# Patient Record
Sex: MALE | Race: WHITE | URBAN - METROPOLITAN AREA
[De-identification: names, ages, dates, MRNs, and addresses within clinical notes are randomized per-mention and may not be internally consistent; named-entity substitution may affect disease eponyms.]

---

## 2022-08-08 PROBLEM — Z00.00 ENCOUNTER FOR PREVENTIVE HEALTH EXAMINATION: Status: ACTIVE | Noted: 2022-08-08

## 2022-08-15 ENCOUNTER — EMERGENCY (EMERGENCY)
Facility: HOSPITAL | Age: 57
LOS: 0 days | Discharge: HOME | End: 2022-08-15
Attending: STUDENT IN AN ORGANIZED HEALTH CARE EDUCATION/TRAINING PROGRAM

## 2022-08-15 VITALS
TEMPERATURE: 98 F | HEIGHT: 67 IN | RESPIRATION RATE: 18 BRPM | OXYGEN SATURATION: 98 % | HEART RATE: 60 BPM | WEIGHT: 167.99 LBS | SYSTOLIC BLOOD PRESSURE: 130 MMHG | DIASTOLIC BLOOD PRESSURE: 84 MMHG

## 2022-08-15 DIAGNOSIS — Z79.84 LONG TERM (CURRENT) USE OF ORAL HYPOGLYCEMIC DRUGS: ICD-10-CM

## 2022-08-15 DIAGNOSIS — Z79.4 LONG TERM (CURRENT) USE OF INSULIN: ICD-10-CM

## 2022-08-15 DIAGNOSIS — E11.9 TYPE 2 DIABETES MELLITUS WITHOUT COMPLICATIONS: ICD-10-CM

## 2022-08-15 DIAGNOSIS — Z88.1 ALLERGY STATUS TO OTHER ANTIBIOTIC AGENTS STATUS: ICD-10-CM

## 2022-08-15 DIAGNOSIS — R42 DIZZINESS AND GIDDINESS: ICD-10-CM

## 2022-08-15 DIAGNOSIS — T38.3X6A UNDERDOSING OF INSULIN AND ORAL HYPOGLYCEMIC [ANTIDIABETIC] DRUGS, INITIAL ENCOUNTER: ICD-10-CM
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
## 2022-08-15 DIAGNOSIS — Z91.138 PATIENT'S UNINTENTIONAL UNDERDOSING OF MEDICATION REGIMEN FOR OTHER REASON: ICD-10-CM
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
## 2022-08-15 LAB
ALBUMIN SERPL ELPH-MCNC: 4.7 G/DL — SIGNIFICANT CHANGE UP (ref 3.5–5.2)
ALP SERPL-CCNC: 65 U/L — SIGNIFICANT CHANGE UP (ref 30–115)
ALT FLD-CCNC: 23 U/L — SIGNIFICANT CHANGE UP (ref 0–41)
ANION GAP SERPL CALC-SCNC: 11 MMOL/L — SIGNIFICANT CHANGE UP (ref 7–14)
APPEARANCE UR: CLEAR — SIGNIFICANT CHANGE UP
AST SERPL-CCNC: 19 U/L — SIGNIFICANT CHANGE UP (ref 0–41)
B-OH-BUTYR SERPL-SCNC: 0.3 MMOL/L — SIGNIFICANT CHANGE UP
BASOPHILS # BLD AUTO: 0.02 K/UL — SIGNIFICANT CHANGE UP (ref 0–0.2)
BASOPHILS NFR BLD AUTO: 0.4 % — SIGNIFICANT CHANGE UP (ref 0–1)
BILIRUB SERPL-MCNC: 0.5 MG/DL — SIGNIFICANT CHANGE UP (ref 0.2–1.2)
BILIRUB UR-MCNC: NEGATIVE — SIGNIFICANT CHANGE UP
BUN SERPL-MCNC: 10 MG/DL — SIGNIFICANT CHANGE UP (ref 10–20)
CALCIUM SERPL-MCNC: 9.8 MG/DL — SIGNIFICANT CHANGE UP (ref 8.5–10.1)
CHLORIDE SERPL-SCNC: 94 MMOL/L — LOW (ref 98–110)
CO2 SERPL-SCNC: 27 MMOL/L — SIGNIFICANT CHANGE UP (ref 17–32)
COLOR SPEC: SIGNIFICANT CHANGE UP
CREAT SERPL-MCNC: 1 MG/DL — SIGNIFICANT CHANGE UP (ref 0.7–1.5)
DIFF PNL FLD: NEGATIVE — SIGNIFICANT CHANGE UP
EGFR: 88 ML/MIN/1.73M2 — SIGNIFICANT CHANGE UP
EOSINOPHIL # BLD AUTO: 0.07 K/UL — SIGNIFICANT CHANGE UP (ref 0–0.7)
EOSINOPHIL NFR BLD AUTO: 1.3 % — SIGNIFICANT CHANGE UP (ref 0–8)
GLUCOSE SERPL-MCNC: 304 MG/DL — HIGH (ref 70–99)
GLUCOSE UR QL: ABNORMAL
HCT VFR BLD CALC: 37.7 % — LOW (ref 42–52)
HGB BLD-MCNC: 13.5 G/DL — LOW (ref 14–18)
IMM GRANULOCYTES NFR BLD AUTO: 0.4 % — HIGH (ref 0.1–0.3)
KETONES UR-MCNC: NEGATIVE — SIGNIFICANT CHANGE UP
LEUKOCYTE ESTERASE UR-ACNC: NEGATIVE — SIGNIFICANT CHANGE UP
LYMPHOCYTES # BLD AUTO: 1.54 K/UL — SIGNIFICANT CHANGE UP (ref 1.2–3.4)
LYMPHOCYTES # BLD AUTO: 28.4 % — SIGNIFICANT CHANGE UP (ref 20.5–51.1)
MCHC RBC-ENTMCNC: 30.3 PG — SIGNIFICANT CHANGE UP (ref 27–31)
MCHC RBC-ENTMCNC: 35.8 G/DL — SIGNIFICANT CHANGE UP (ref 32–37)
MCV RBC AUTO: 84.5 FL — SIGNIFICANT CHANGE UP (ref 80–94)
MONOCYTES # BLD AUTO: 0.44 K/UL — SIGNIFICANT CHANGE UP (ref 0.1–0.6)
MONOCYTES NFR BLD AUTO: 8.1 % — SIGNIFICANT CHANGE UP (ref 1.7–9.3)
NEUTROPHILS # BLD AUTO: 3.33 K/UL — SIGNIFICANT CHANGE UP (ref 1.4–6.5)
NEUTROPHILS NFR BLD AUTO: 61.4 % — SIGNIFICANT CHANGE UP (ref 42.2–75.2)
NITRITE UR-MCNC: NEGATIVE — SIGNIFICANT CHANGE UP
NRBC # BLD: 0 /100 WBCS — SIGNIFICANT CHANGE UP (ref 0–0)
PH UR: 6 — SIGNIFICANT CHANGE UP (ref 5–8)
PLATELET # BLD AUTO: 141 K/UL — SIGNIFICANT CHANGE UP (ref 130–400)
POTASSIUM SERPL-MCNC: 3.7 MMOL/L — SIGNIFICANT CHANGE UP (ref 3.5–5)
POTASSIUM SERPL-SCNC: 3.7 MMOL/L — SIGNIFICANT CHANGE UP (ref 3.5–5)
PROT SERPL-MCNC: 6.8 G/DL — SIGNIFICANT CHANGE UP (ref 6–8)
PROT UR-MCNC: NEGATIVE — SIGNIFICANT CHANGE UP
RBC # BLD: 4.46 M/UL — LOW (ref 4.7–6.1)
RBC # FLD: 12.2 % — SIGNIFICANT CHANGE UP (ref 11.5–14.5)
SODIUM SERPL-SCNC: 132 MMOL/L — LOW (ref 135–146)
SP GR SPEC: 1.01 — SIGNIFICANT CHANGE UP (ref 1.01–1.03)
UROBILINOGEN FLD QL: SIGNIFICANT CHANGE UP
WBC # BLD: 5.42 K/UL — SIGNIFICANT CHANGE UP (ref 4.8–10.8)
WBC # FLD AUTO: 5.42 K/UL — SIGNIFICANT CHANGE UP (ref 4.8–10.8)

## 2022-08-15 PROCEDURE — 99285 EMERGENCY DEPT VISIT HI MDM: CPT

## 2022-08-15 PROCEDURE — 93010 ELECTROCARDIOGRAM REPORT: CPT

## 2022-08-15 RX ORDER — SODIUM CHLORIDE 9 MG/ML
1000 INJECTION, SOLUTION INTRAVENOUS ONCE
Refills: 0 | Status: DISCONTINUED | OUTPATIENT
Start: 2022-08-15 | End: 2022-08-15

## 2022-08-15 NOTE — ED PROVIDER NOTE - OBJECTIVE STATEMENT
57-year-old male past medical history of diabetes recently prescribed insulin and metformin presenting for medical evaluation.  As per patient states his hemoglobin A1c was 15 and that he is unsure of how to administer his insulin.  Insulin is currently at home in the fridge has not taken the metformin either as he is unsure of the side effects.  Last meal was in the morning consisted of special K cereal and almonds has not eaten anything since.  Had an episode of lightheadedness at 930 after being on the phone with his friend for 5 hours.  Denies headache visual changes tinnitus paresthesias slurred speech facial droop difficulty ambulating

## 2022-08-15 NOTE — ED PROVIDER NOTE - ATTENDING APP SHARED VISIT CONTRIBUTION OF CARE
57-year-old male past medical history of newly diagnosed diabetes presents to the emergency department with multiple medical complaints.  Patient reports feeling lightheaded and dizzy at around 9:30 PM because he did not eat all day.  Patient also reporting that he was newly diagnosed with diabetes and has not taken his medications yet because he is "in denial and scared".  Patient denies any chest pain or shortness of breath.      No fever, nausea, vomiting, chest pain, sob, palpitations, diaphoresis, cough, headache, numbness/tingling, neck pain, abdominal pain, diarrhea, constipation, melena/brbpr, urinary symptoms, trauma, edema, calf pain or swelling, sick contacts, recent travel or rash.     Vital Signs: I have reviewed the initial vital signs.  Constitutional: Patient in no acute distress.  Integumentary: No rash.  ENT: MMM  NECK: Supple.   Cardiovascular: RRR, radial pulses 2/4 bilaterally.   Respiratory: Breath sounds CTA b/l, no wheezing or crackles, good air exchange, good resp effort and excursion, no accessory muscle use, no stridor.  Gastrointestinal: Abdomen soft, non-tender, non-distended, no rebound tenderness or guarding, no CVAT.   Musculoskeletal: FROM, no edema, no calf pain/swelling/erythema.  Neurologic: AOx3, CN 2-12 grossly intact. +5/5 strength and sensation wnl in all extremities. No pronator drift, no dysarthria, no ataxia, normal gait, no DM/DDK, negative romberg.

## 2022-08-15 NOTE — ED ADULT NURSE NOTE - NSFALLRSKOUTCOME_ED_ALL_ED
Discontinue Prozac  Increase Viibryd to 20mg per day x 14 days, then increase to 30mg per day  Continue Wellbutrin at present dose    While adjusting these medications it is possible to get a rare complication called Serotonin Syndrome (Hot and cold flashes or sweats, fever, faintness/dizziness, flushed skin), if this occurs then go to the hospital immediately for treatment.      
Universal Safety Interventions

## 2022-08-15 NOTE — ED ADULT TRIAGE NOTE - CHIEF COMPLAINT QUOTE
I was diagnosed with A1C diabetic in July, I haven't taken any medicines, I feel lightheaded, my eyes are a little bit cloudy, I don't like the way I'm feeling - patient   Friend with patient states patient needs education and teaching on the diabetic medications, patient is feeling overwhelmed causing non-compliance. Patient's PMD did not instruct patient. Friend also requesting dietary consult

## 2022-08-15 NOTE — ED PROVIDER NOTE - PHYSICAL EXAMINATION
Physical Exam    Vital Signs: I have reviewed the initial vital signs.  Constitutional: well-nourished, appears stated age, no acute distress  Eyes: Conjunctiva pink, Sclera clear, PERRLA, EOMI, no ptosis, no entrapment, no racoon eyes.  Cardiovascular: S1 and S2, regular rate, regular rhythm, well-perfused extremities, radial pulses equal and 2+, calves nonttp, equal in size  Respiratory: unlabored respiratory effort, speaking in full sentences, handling oral secretions, % on RA , on nasal cannula, clear to auscultation bilaterally no wheezing, rales and rhonchi  Gastrointestinal: soft, non-tender abdomen, no pulsatile mass, normal bowl sounds  Musculoskeletal: supple neck, no lower extremity edema, no midline tenderness, paraspinal tenderness,  no gross bony deformities or swelling.  Integumentary: warm, dry, no rashes, lacerations,  Neurologic: awake, alert, cranial nerves II-XII grossly intact, extremities’ motor and sensory functions grossly intact, no nystagmus, tremors, fasiculations,  facial droop, no ataxia, no dysmetria

## 2022-08-15 NOTE — ED PROVIDER NOTE - CLINICAL SUMMARY MEDICAL DECISION MAKING FREE TEXT BOX
57-year-old male presents to the emergency department with lightheadedness.  Patient reports feeling much better at this time.  EKG nonischemic.  Neurologic exam nonfocal.  Labs revealing hyperglycemia without signs of DKA. Patient reports he was scared about his new diagnosis of diabetes but will follow up with primary care physician for further care and management out on an outpatient basis. Patient to be discharged from ED. Any available test results were discussed with patient and/or family. Verbal instructions given, including instructions to return to ED immediately for any new, worsening, or concerning symptoms. Patient endorsed understanding. Written discharge instructions additionally given, including follow-up plan.

## 2022-08-15 NOTE — ED PROVIDER NOTE - NSFOLLOWUPINSTRUCTIONS_ED_ALL_ED_FT
Make an appointment with the medicine clinic.  Our Emergency Department Referral Coordinators will be reaching out to you in the next 24-48 hours from 9:00am to 5:00pm with a follow up appointment. Please expect a phone call from the hospital in that time frame. If you do not receive a call or if you have any questions or concerns, you can reach them at [phone] or [phone].    Hyperglycemia  Hyperglycemia occurs when the level of sugar (glucose) in the blood is too high. Glucose is a type of sugar that provides the body's main source of energy. Certain hormones (insulin and glucagon) control the level of glucose in the blood. Insulin lowers blood glucose, and glucagon increases blood glucose. Hyperglycemia can result from having too little insulin in the bloodstream, or from the body not responding normally to insulin.  Hyperglycemia occurs most often in people who have diabetes (diabetes mellitus), but it can happen in people who do not have diabetes. It can develop quickly, and it can be life-threatening if it causes you to become severely dehydrated (diabetic ketoacidosis or hyperglycemic hyperosmolar state). Severe hyperglycemia is a medical emergency.  What are the causes?  If you have diabetes, hyperglycemia may be caused by:  Diabetes medicine.Medicines that increase blood glucose or affect your diabetes control.Not eating enough, or not eating often enough.Changes in physical activity level.Being sick or having an infection.If you have prediabetes or undiagnosed diabetes:  Hyperglycemia may be caused by those conditions.If you do not have diabetes, hyperglycemia may be caused by:  Certain medicines, including steroid medicines, beta-blockers, epinephrine, and thiazide diuretics.Stress.Serious illness.Surgery.Diseases of the pancreas.Infection.What increases the risk?  Hyperglycemia is more likely to develop in people who have risk factors for diabetes, such as:  Having a family member with diabetes.Having a gene for type 1 diabetes that is passed from parent to child (inherited).Living in an area with cold weather conditions.Exposure to certain viruses.Certain conditions in which the body's disease-fighting (immune) system attacks itself (autoimmune disorders).Being overweight or obese.Having an inactive (sedentary) lifestyle.Having been diagnosed with insulin resistance.Having a history of prediabetes, gestational diabetes, or polycystic ovarian syndrome (PCOS).Being of American-, -American, /, or / descent.What are the signs or symptoms?  Hyperglycemia may not cause any symptoms. If you do have symptoms, they may include early warning signs, such as:  Increased thirst.Hunger.Feeling very tired.Needing to urinate more often than usual.Blurry vision.Other symptoms may develop if hyperglycemia gets worse, such as:  Dry mouth.Loss of appetite.Fruity-smelling breath.Weakness.Unexpected or rapid weight gain or weight loss.Tingling or numbness in the hands or feet.Headache.Skin that does not quickly return to normal after being lightly pinched and released (poor skin turgor).Abdominal pain.Cuts or bruises that are slow to heal.How is this diagnosed?  Hyperglycemia is diagnosed with a blood test to measure your blood glucose level. This blood test is usually done while you are having symptoms. Your health care provider may also do a physical exam and review your medical history.  You may have more tests to determine the cause of your hyperglycemia, such as:  A fasting blood glucose (FBG) test. You will not be allowed to eat (you will fast) for at least 8 hours before a blood sample is taken.An A1c (hemoglobin A1c) blood test. This provides information about blood glucose control over the previous 2–3 months.An oral glucose tolerance test (OGTT). This measures your blood glucose at two times:  After fasting. This is your baseline blood glucose level.Two hours after drinking a beverage that contains glucose.How is this treated?  Treatment depends on the cause of your hyperglycemia. Treatment may include:  Taking medicine to regulate your blood glucose levels. If you take insulin or other diabetes medicines, your medicine or dosage may be adjusted.Lifestyle changes, such as exercising more, eating healthier foods, or losing weight.Treating an illness or infection, if this caused your hyperglycemia.Checking your blood glucose more often.Stopping or reducing steroid medicines, if these caused your hyperglycemia.If your hyperglycemia becomes severe and it results in hyperglycemic hyperosmolar state, you must be hospitalized and given IV fluids.  Follow these instructions at home:  Image   General instructions     Take over-the-counter and prescription medicines only as told by your health care provider.Do not use any products that contain nicotine or tobacco, such as cigarettes and e-cigarettes. If you need help quitting, ask your health care provider.Limit alcohol intake to no more than 1 drink per day for nonpregnant women and 2 drinks per day for men. One drink equals 12 oz of beer, 5 oz of wine, or 1½ oz of hard liquor.Learn to manage stress. If you need help with this, ask your health care provider.Keep all follow-up visits as told by your health care provider. This is important.Eating and drinking     Image   Maintain a healthy weight.Exercise regularly, as directed by your health care provider.Stay hydrated, especially when you exercise, get sick, or spend time in hot temperatures.Eat healthy foods, such as:  Lean proteins.Complex carbohydrates.Fresh fruits and vegetables.Low-fat dairy products.Healthy fats.Drink enough fluid to keep your urine clear or pale yellow.If you have diabetes:     Make sure you know the symptoms of hyperglycemia.Follow your diabetes management plan, as told by your health care provider. Make sure you:  Take your insulin and medicines as directed.Follow your exercise plan.Follow your meal plan. Eat on time, and do not skip meals.Check your blood glucose as often as directed. Make sure to check your blood glucose before and after exercise. If you exercise longer or in a different way than usual, check your blood glucose more often.Follow your sick day plan whenever you cannot eat or drink normally. Make this plan in advance with your health care provider.Share your diabetes management plan with people in your workplace, school, and household.Check your urine for ketones when you are ill and as told by your health care provider.Carry a medical alert card or wear medical alert jewelry.Contact a health care provider if:  Your blood glucose is at or above 240 mg/dL (13.3 mmol/L) for 2 days in a row.You have problems keeping your blood glucose in your target range.You have frequent episodes of hyperglycemia.Get help right away if:  You have difficulty breathing.You have a change in how you think, feel, or act (mental status).You have nausea or vomiting that does not go away.These symptoms may represent a serious problem that is an emergency. Do not wait to see if the symptoms will go away. Get medical help right away. Call your local emergency services (911 in the [place] Do not drive yourself to the hospital.   Summary  Hyperglycemia occurs when the level of sugar (glucose) in the blood is too high.Hyperglycemia is diagnosed with a blood test to measure your blood glucose level. This blood test is usually done while you are having symptoms. Your health care provider may also do a physical exam and review your medical history.If you have diabetes, follow your diabetes management plan as told by your health care provider.Contact your health care provider if you have problems keeping your blood glucose in your target range.

## 2022-08-15 NOTE — ED PROVIDER NOTE - NSPTACCESSSVCSAPPTDETAILS_ED_ALL_ED_FT
ppt requires immediate f/u within 1-3 days of ER visit for education of  insulin administration and establish PMD care

## 2022-08-15 NOTE — ED PROVIDER NOTE - CARE PLAN
Principal Discharge DX:	DM (diabetes mellitus)   1 Principal Discharge DX:	DM (diabetes mellitus)  Assessment and plan of treatment:	plan - ekg, labs, reassess

## 2022-08-15 NOTE — ED PROVIDER NOTE - PROGRESS NOTE DETAILS
Patient given rapid follow-up to medicine clinic and understands to call if not called within 24 to 48 hours.  Instructed to begin taking medication for hyperglycemia.

## 2022-08-15 NOTE — ED PROVIDER NOTE - PATIENT PORTAL LINK FT
You can access the FollowMyHealth Patient Portal offered by [facility] by registering at the following website: [email or web address]. By joining [employer]’s FollowMyHealth portal, you will also be able to view your health information using other applications (apps) compatible with our system.

## 2022-08-15 NOTE — ED ADULT NURSE NOTE - CHIEF COMPLAINT QUOTE
I was diagnosed with A1C diabetic in July, I haven't taken any medicines, I feel lightheaded, my eyes are a little bit cloudy, I don't like the way I'm feeling - patient   Friend with patient states patient needs education and teaching on the diabetic medications, patient is feeling overwhelmed causing non-compliance. Patient's PMD did not instruct patient. Friend also requesting dietary consult <<-----Click here for Discharge Medication Review

## 2022-08-15 NOTE — ED ADULT NURSE NOTE - OBJECTIVE STATEMENT
Patient reports coming in for lightheadedness, recently diagnosed with DM and does not understand monitoring of sugars, and how to use insulin, he did not yet  his accucheck, insulin and syringes, Patient denies N/V/D, Sob, and CP.

## 2022-08-15 NOTE — ED PROVIDER NOTE - NS ED ATTENDING STATEMENT MOD
This was a shared visit with the SUDARSHAN. I reviewed and verified the documentation and independently performed the documented:

## 2022-09-21 ENCOUNTER — APPOINTMENT (OUTPATIENT)
Dept: INTERNAL MEDICINE | Facility: CLINIC | Age: 57
End: 2022-09-21

## 2025-06-09 NOTE — ED PROVIDER NOTE - IV ALTEPLASE EXCL REL HIDDEN
Called patient regarding accessing [employer] notes.  Advised patient I would send last progress notes from Dr Delcid office visit as well as [employer] information.  Patient is asking if COVID could have caused DVT and GI symptoms.  Also wanted to know when would be an appropriate time to recheck DVT after Eliquis.  Advised patient I would relay questions to clinical staff.  
show